# Patient Record
Sex: MALE | Race: WHITE | NOT HISPANIC OR LATINO | Employment: OTHER | URBAN - METROPOLITAN AREA
[De-identification: names, ages, dates, MRNs, and addresses within clinical notes are randomized per-mention and may not be internally consistent; named-entity substitution may affect disease eponyms.]

---

## 2021-11-09 ENCOUNTER — OFFICE VISIT (OUTPATIENT)
Dept: DERMATOLOGY | Facility: CLINIC | Age: 71
End: 2021-11-09
Payer: MEDICARE

## 2021-11-09 VITALS — BODY MASS INDEX: 34.96 KG/M2 | TEMPERATURE: 98.3 F | HEIGHT: 69 IN | WEIGHT: 236 LBS

## 2021-11-09 DIAGNOSIS — D22.60 MULTIPLE BENIGN MELANOCYTIC NEVI OF UPPER AND LOWER EXTREMITIES AND TRUNK: Primary | ICD-10-CM

## 2021-11-09 DIAGNOSIS — B35.3 TINEA PEDIS OF BOTH FEET: ICD-10-CM

## 2021-11-09 DIAGNOSIS — D22.70 MULTIPLE BENIGN MELANOCYTIC NEVI OF UPPER AND LOWER EXTREMITIES AND TRUNK: Primary | ICD-10-CM

## 2021-11-09 DIAGNOSIS — D22.5 MULTIPLE BENIGN MELANOCYTIC NEVI OF UPPER AND LOWER EXTREMITIES AND TRUNK: Primary | ICD-10-CM

## 2021-11-09 DIAGNOSIS — B35.6 TINEA CRURIS: ICD-10-CM

## 2021-11-09 DIAGNOSIS — D18.01 CHERRY ANGIOMA: ICD-10-CM

## 2021-11-09 PROCEDURE — 99203 OFFICE O/P NEW LOW 30 MIN: CPT | Performed by: DERMATOLOGY

## 2021-11-09 RX ORDER — DIFLUPREDNATE 0.5 MG/ML
1 EMULSION OPHTHALMIC 4 TIMES DAILY
COMMUNITY
Start: 2021-09-28

## 2021-11-09 RX ORDER — KETOROLAC TROMETHAMINE 5 MG/ML
SOLUTION OPHTHALMIC
COMMUNITY
Start: 2021-07-15

## 2021-11-09 RX ORDER — ATORVASTATIN CALCIUM 40 MG/1
TABLET, FILM COATED ORAL
COMMUNITY
Start: 2021-11-07

## 2021-11-09 RX ORDER — OMEPRAZOLE 20 MG/1
20 CAPSULE, DELAYED RELEASE ORAL
COMMUNITY

## 2021-11-09 RX ORDER — KETOCONAZOLE 20 MG/G
CREAM TOPICAL
Qty: 60 G | Refills: 2 | Status: SHIPPED | OUTPATIENT
Start: 2021-11-09

## 2022-09-26 ENCOUNTER — OFFICE VISIT (OUTPATIENT)
Dept: DERMATOLOGY | Facility: CLINIC | Age: 72
End: 2022-09-26
Payer: MEDICARE

## 2022-09-26 VITALS — WEIGHT: 229 LBS | TEMPERATURE: 96.9 F | HEIGHT: 69 IN | BODY MASS INDEX: 33.92 KG/M2

## 2022-09-26 DIAGNOSIS — Z12.83 SCREENING EXAM FOR SKIN CANCER: Primary | ICD-10-CM

## 2022-09-26 PROCEDURE — 99212 OFFICE O/P EST SF 10 MIN: CPT | Performed by: DERMATOLOGY

## 2022-09-26 NOTE — PATIENT INSTRUCTIONS
VIEYRA ANGIOMAS    Assessment and Plan:  Based on a thorough discussion of this condition and the management approach to it (including a comprehensive discussion of the known risks, side effects and potential benefits of treatment), the patient (family) agrees to implement the following specific plan:  Reassured benign    Assessment and Plan:    Cherry angioma, also known as [employer] spots, are benign vascular skin lesions  A "cherry angioma" is a firm red, blue or purple papule, 0 1-1 cm in diameter  When thrombosed, they can appear black in colour until evaluated with a dermatoscope when the red or purple colour is more easily seen  Cherry angioma may develop on any part of the body but most often appear on the scalp, face, lips and trunk  An angioma is due to proliferating endothelial cells; these are the cells that line the inside of a blood vessel  Angiomas can arise in early life or later in life; the most common type of angioma is a cherry angioma  Cherry angiomas are very common in males and females of any age or race  They are more noticeable in white skin than in skin of colour  They markedly increase in number from about the age of 36  There may be a family history of similar lesions  Eruptive cherry angiomas have been rarely reported to be associated with internal malignancy  The cause of angiomas is unknown  Genetic analysis of cherry angiomas has shown that they frequently carry specific somatic missense mutations in the GNAQ and GNA11 (Q209H) genes, which are involved in other vascular and melanocytic proliferations  Cherry angioma is usually diagnosed clinically and no investigations are necessary for the majority of lesions  It has a characteristic red-clod or lobular pattern on dermatoscopy (called lacunar pattern using conventional pattern analysis)  When there is uncertainty about the diagnosis, a biopsy may be performed   The angioma is composed of venules in a thickened papillary dermis  Collagen bundles may be prominent between the lobules  Cherry angiomas are harmless, so they do not usually have to be treated  Occasionally, they are removed to exclude a malignant skin lesion such as a nodular melanoma or because they are irritated or bleeding (and a subsequent risk for infection)  To decrease friction over the lesions, we recommend Neutrogena Daily Defense SPF 50+ at least 3 times a day  SEBORRHEIC KERATOSIS; NON-INFLAMED    Assessment and Plan:  Based on a thorough discussion of this condition and the management approach to it (including a comprehensive discussion of the known risks, side effects and potential benefits of treatment), the patient (family) agrees to implement the following specific plan:  Reassured benign    Seborrheic Keratosis  A seborrheic keratosis is a harmless warty spot that appears during adult life as a common sign of skin aging  Seborrheic keratoses can arise on any area of skin, covered or uncovered, with the usual exception of the palms and soles  They do not arise from mucous membranes  Seborrheic keratoses can have highly variable appearance  Seborrheic keratoses are extremely common  It has been estimated that over 90% of adults over the age of 61 years have one or more of them  They occur in males and females of all races, typically beginning to erupt in the 35s or 45s  They are uncommon under the age of 21 years  The precise cause of seborrhoeic keratoses is not known  Seborrhoeic keratoses are considered degenerative in nature  As time goes by, seborrheic keratoses tend to become more numerous  Some people inherit a tendency to develop a very large number of them; some people may have hundreds of them      The name "seborrheic keratosis" is misleading, because these lesions are not limited to a seborrhoeic distribution (scalp, mid-face, chest, upper back), nor are they formed from sebaceous glands, nor are they associated with sebum -- which is greasy  Seborrheic keratosis may also be called "SK," "Seb K," "basal cell papilloma," "senile wart," or "barnacle "      Researchers have noted:  Eruptive seborrhoeic keratoses can follow sunburn or dermatitis  Skin friction may be the reason they appear in body folds  Viral cause (e g , human papillomavirus) seems unlikely  Stable and clonal mutations or activation of FRFR3, PIK3CA, ZOHAIB, AKT1 and EGFR genes are found in seborrhoeic keratoses  Seborrhoeic keratosis can arise from solar lentigo  FRFR3 mutations also arise in solar lentigines  These mutations are associated with increased age and location on the head and neck, suggesting a role of ultraviolet radiation in these lesions  Seborrheic keratoses do not harbour tumour suppressor gene mutations  Epidermal growth factor receptor inhibitors, which are used to treat some cancers, often result in an increase in verrucal (warty) keratoses  There is no easy way to remove multiple lesions on a single occasion  Unless a specific lesion is "inflamed" and is causing pain or stinging/burning or is bleeding, most insurance companies do not authorize treatment  MELANOCYTIC NEVI ("Moles")    Assessment and Plan:  Based on a thorough discussion of this condition and the management approach to it (including a comprehensive discussion of the known risks, side effects and potential benefits of treatment), the patient (family) agrees to implement the following specific plan:    Reassure benign  Monitor for changes  Apply SPF 50 or higher multiple times a day  Wear sun protecting hats and clothing  Melanocytic Nevi  Melanocytic nevi ("moles") are caused by collections of the color producing skin cells, or melanocytes, in 1 area in the skin  They can range in color from pink to dark brown and be either raised or flat  Some moles are present at birth (I e , "congenital nevi"), while others come up later in life (i e , "acquired nevi")    Medardo Rosales exposure also stimulates the body to make more moles, ie the more sun you get the more moles you'll grow  Clinically distinguishing a healthy mole from melanoma may be difficult  The "ABCDE's" of moles have been suggested as a means of helping to alert a person to a suspicious mole and the possible increased risk of melanoma  Asymmetry: Healthy moles tend to be symmetric, while melanomas are often asymmetric  Asymmetry means if you draw a line through the mole, the two halves do not match in color, size, shape, or surface texture Any mole that starts to demonstrate "asymmetry" should be examined promptly by a board certified dermatologist      Border: Healthy moles tend to have discrete, even borders  The border of a melanoma often blends into the normal skin and does not sharply delineate the mole from normal skin  Any mole that starts to demonstrate "uneven borders" should be examined promptly     Color: Healthy moles tend to be one color throughout  Melanomas tend to be made up of different colors ranging from dark black, blue, white, or red  Any mole that demonstrates a color change should be examined promptly    Diameter: Healthy moles tend to be smaller than 0 6 cm in size; an exception are "congenital nevi" that can be larger  Melanomas tend to grow and can often be greater than 0 6 cm (1/4 of an inch, or the size of a pencil eraser)  This is only a guideline, and many normal moles may be larger than 0 6 cm without being unhealthy  Any mole that starts to change in size (small to bigger or bigger to smaller) should be examined promptly    Evolving: Healthy moles tend to "stay the same "  Melanomas may often show signs of change or evolution such as a change in size, shape, color, or elevation    Any mole that starts to itch, bleed, crust, burn, hurt, or ulcerate or demonstrate a change or evolution should be examined promptly by a board certified dermatologist       What are atypical moles or dysplastic nevi? Dysplastic moles are moles that have some of the ABCDE  changes listed above but  are not cancerous  Sometimes a biopsy and microscopic examination are needed to determine the difference  They may indicate an increased risk of melanoma in that person, especially if there is a family history of melanoma  What is a Melanoma? The main concern when looking at a new or changing mole it to evaluate whether it may be a melanoma  The appearance of a "new mole" remains one of the most reliable methods for identifying a malignant melanoma  A melanoma is a type of skin cancer that can be deadly if it spreads throughout the body  The prognosis of a Melanoma depends on how deep it has penetrated in the skin  If caught early, they generally will not have had time to grow into the deeper layers of the skin and they cure rate is then very high  Once the melanoma grows deeper into the skin, the cure rate drops dramatically  Therefore, early detection and removal of a malignant melanoma results in a much better chance of complete cure

## 2022-09-26 NOTE — PROGRESS NOTES
[facility] Follow Up Note    Patient Name: Herman Record  Encounter Date: 09/26/22    Today's Chief Concerns:  Haroon Marcial Concern #1:  Skin check      Current Medications:    Current Outpatient Medications:     atorvastatin (LIPITOR) 40 mg tablet, , Disp: , Rfl:     ketoconazole (NIZORAL) 2 % cream, Apply topically to feet twice a day for 4 week, Disp: 60 g, Rfl: 2    omeprazole (PriLOSEC) 20 mg delayed release capsule, Take 20 mg by mouth, Disp: , Rfl:     Difluprednate 0 05 % EMUL, Apply 1 drop to eye 4 (four) times a day (Patient not taking: Reported on 9/26/2022), Disp: , Rfl:     ketorolac (ACULAR) 0 5 % ophthalmic solution, START USING 1 GTT IN RIGHT EYE 3 DAYS PRIOR TO SURGERY, THEN FOLLOW DROP CHECK LIST (Patient not taking: Reported on 9/26/2022), Disp: , Rfl:     CONSTITUTIONAL:   Vitals:    09/26/22 1245   Temp: (!) 96 9 °F (36 1 °C)   TempSrc: Temporal   Weight: 104 kg (229 lb)   Height: 5' 9" (1 753 m)         Specific Alerts:    Have you been seen by a [facility] in the last 3 years? YES    Are you pregnant or planning to become pregnant? No    Are you currently or planning to be nursing or breast feeding? No    No Known Allergies    May we call your Preferred Phone number to discuss your specific medical information? YES    May we leave a detailed message that includes your specific medical information? YES    Have you traveled outside of the [facility] in the past 3 months? No    Do you currently have a pacemaker or defibrillator? No    Do you have any artificial heart valves, joints, plates, screws, rods, stents, pins, etc? No   - If Yes, were any placed within the last 2 years? Do you require any medications prior to a surgical procedure? No    Are you taking any medications that cause you to bleed more easily ("blood thinners") No    Have you ever experienced a rapid heartbeat with epinephrine?  No    Have you ever been treated with "gold" (gold sodium thiomalate) therapy? No    [facility] can help with wrinkles, "laugh lines," facial volume loss, "double chin," "love handles," age spots, and more  Are you interested in learning today about some of the skin enhancement procedures that we offer? (If Yes, please provide more detail) No    Review of Systems:  Have you recently had or currently have any of the following? · Fever or chills: No  · Night Sweats: No  · Headaches: No  · Weight Gain: No  · Weight Loss: No  · Blurry Vision: No  · Nausea: No  · Vomiting: No  · Diarrhea: No  · Blood in Stool: No  · Abdominal Pain: No  · Itchy Skin: No  · Painful Joints: yes  · Swollen Joints: YES  · Muscle Pain: No  · Irregular Mole: No  · Sun Burn: No  · Dry Skin: No  · Skin Color Changes: No  · Scar or Keloid: No  · Cold Sores/Fever Blisters: No  · Bacterial Infections/MRSA: No  · Anxiety: No  · Depression: No  · Suicidal or Homicidal Thoughts: No      PSYCH: Normal mood and affect  EYES: Normal conjunctiva  ENT: Normal lips and oral mucosa  CARDIOVASCULAR: No edema  RESPIRATORY: Normal respirations  HEME/LYMPH/IMMUNO:  No regional lymphadenopathy except as noted below in ASSESSMENT AND PLAN BY DIAGNOSIS    FULL ORGAN SYSTEM SKIN EXAM (SKIN)  Hair, Scalp, Ears, Face Normal except as noted below in Assessment   Neck, Cervical Chain Nodes Normal except as noted below in Assessment   Right Arm/Hand/Fingers Normal except as noted below in Assessment   Left Arm/Hand/Fingers Normal except as noted below in Assessment   Chest/Breasts/Axillae Viewed areas Normal except as noted below in Assessment   Abdomen, Umbilicus Normal except as noted below in Assessment   Back/Spine Normal except as noted below in Assessment   Right Leg, Foot, Toes Normal except as noted below in Assessment   Left Leg, Foot, Toes Normal except as noted below in Assessment     SCREENING SKIN EXAMINATION    Physical Exam:   Anatomic Location Affected:   Integument   Morphological Description:  Normal skin appearance   Pertinent Positives:   Pertinent Negatives: Additional History of Present Condition:  present for years  One Gina Martinez angioma, also known as [employer] spots, are benign vascular skin lesions  A "cherry angioma" is a firm red, blue or purple papule, 0 1-1 cm in diameter  When thrombosed, they can appear black in colour until evaluated with a dermatoscope when the red or purple colour is more easily seen  Cherry angioma may develop on any part of the body but most often appear on the scalp, face, lips and trunk  An angioma is due to proliferating endothelial cells; these are the cells that line the inside of a blood vessel  Angiomas can arise in early life or later in life; the most common type of angioma is a cherry angioma  Cherry angiomas are very common in males and females of any age or race  They are more noticeable in white skin than in skin of colour  They markedly increase in number from about the age of 36  There may be a family history of similar lesions  Eruptive cherry angiomas have been rarely reported to be associated with internal malignancy  The cause of angiomas is unknown  Genetic analysis of cherry angiomas has shown that they frequently carry specific somatic missense mutations in the GNAQ and GNA11 (Q209H) genes, which are involved in other vascular and melanocytic proliferations  Cherry angioma is usually diagnosed clinically and no investigations are necessary for the majority of lesions  It has a characteristic red-clod or lobular pattern on dermatoscopy (called lacunar pattern using conventional pattern analysis)  When there is uncertainty about the diagnosis, a biopsy may be performed  The angioma is composed of venules in a thickened papillary dermis  Collagen bundles may be prominent between the lobules  Cherry angiomas are harmless, so they do not usually have to be treated   Occasionally, they are removed to exclude a malignant skin lesion such as a nodular melanoma or because they are irritated or bleeding (and a subsequent risk for infection)  To decrease friction over the lesions, we recommend Neutrogena Daily Defense SPF 50+ at least 3 times a day  Seborrheic Keratosis (no such lesions of note)  A seborrheic keratosis is a harmless warty spot that appears during adult life as a common sign of skin aging  Seborrheic keratoses can arise on any area of skin, covered or uncovered, with the usual exception of the palms and soles  They do not arise from mucous membranes  Seborrheic keratoses can have highly variable appearance  Seborrheic keratoses are extremely common  It has been estimated that over 90% of adults over the age of 61 years have one or more of them  They occur in males and females of all races, typically beginning to erupt in the 35s or 45s  They are uncommon under the age of 21 years  The precise cause of seborrhoeic keratoses is not known  Seborrhoeic keratoses are considered degenerative in nature  As time goes by, seborrheic keratoses tend to become more numerous  Some people inherit a tendency to develop a very large number of them; some people may have hundreds of them  The name "seborrheic keratosis" is misleading, because these lesions are not limited to a seborrhoeic distribution (scalp, mid-face, chest, upper back), nor are they formed from sebaceous glands, nor are they associated with sebum -- which is greasy    Seborrheic keratosis may also be called "SK," "Seb K," "basal cell papilloma," "senile wart," or "barnacle "      Researchers have noted:   Eruptive seborrhoeic keratoses can follow sunburn or dermatitis   Skin friction may be the reason they appear in body folds   Viral cause (e g , human papillomavirus) seems unlikely   Stable and clonal mutations or activation of FRFR3, PIK3CA, ZOHAIB, AKT1 and EGFR genes are found in seborrhoeic keratoses   Seborrhoeic keratosis can arise from solar lentigo   FRFR3 mutations also arise in solar lentigines  These mutations are associated with increased age and location on the head and neck, suggesting a role of ultraviolet radiation in these lesions   Seborrheic keratoses do not harbour tumour suppressor gene mutations   Epidermal growth factor receptor inhibitors, which are used to treat some cancers, often result in an increase in verrucal (warty) keratoses  There is no easy way to remove multiple lesions on a single occasion  Unless a specific lesion is "inflamed" and is causing pain or stinging/burning or is bleeding, most insurance companies do not authorize treatment  · No atypical skin lesions in examined  · Monitor for changes  · Apply SPF 50 or higher multiple times a day  Wear sun protecting hats and clothing       Melanocytic Nevi  Melanocytic nevi ("moles") are caused by collections of the color producing skin cells, or melanocytes, in 1 area in the skin  They can range in color from pink to dark brown and be either raised or flat  Some moles are present at birth (I e , "congenital nevi"), while others come up later in life (i e , "acquired nevi")  Sunny Peralta exposure also stimulates the body to make more moles, ie the more sun you get the more moles you'll grow  Clinically distinguishing a healthy mole from melanoma may be difficult  The "ABCDE's" of moles have been suggested as a means of helping to alert a person to a suspicious mole and the possible increased risk of melanoma  Asymmetry: Healthy moles tend to be symmetric, while melanomas are often asymmetric  Asymmetry means if you draw a line through the mole, the two halves do not match in color, size, shape, or surface texture Any mole that starts to demonstrate "asymmetry" should be examined promptly by a board certified dermatologist      Border: Healthy moles tend to have discrete, even borders    The border of a melanoma often blends into the normal skin and does not sharply delineate the mole from normal skin  Any mole that starts to demonstrate "uneven borders" should be examined promptly     Color: Healthy moles tend to be one color throughout  Melanomas tend to be made up of different colors ranging from dark black, blue, white, or red  Any mole that demonstrates a color change should be examined promptly    Diameter: Healthy moles tend to be smaller than 0 6 cm in size; an exception are "congenital nevi" that can be larger  Melanomas tend to grow and can often be greater than 0 6 cm (1/4 of an inch, or the size of a pencil eraser)  This is only a guideline, and many normal moles may be larger than 0 6 cm without being unhealthy  Any mole that starts to change in size (small to bigger or bigger to smaller) should be examined promptly    Evolving: Healthy moles tend to "stay the same "  Melanomas may often show signs of change or evolution such as a change in size, shape, color, or elevation  Any mole that starts to itch, bleed, crust, burn, hurt, or ulcerate or demonstrate a change or evolution should be examined promptly by a board certified dermatologist       What are atypical moles or dysplastic nevi? Dysplastic moles are moles that have some of the ABCDE  changes listed above but  are not cancerous  Sometimes a biopsy and microscopic examination are needed to determine the difference  They may indicate an increased risk of melanoma in that person, especially if there is a family history of melanoma  What is a Melanoma? The main concern when looking at a new or changing mole it to evaluate whether it may be a melanoma  The appearance of a "new mole" remains one of the most reliable methods for identifying a malignant melanoma  A melanoma is a type of skin cancer that can be deadly if it spreads throughout the body  The prognosis of a Melanoma depends on how deep it has penetrated in the skin    If caught early, they generally will not have had time to grow into the deeper layers of the skin and they cure rate is then very high  Once the melanoma grows deeper into the skin, the cure rate drops dramatically  Therefore, early detection and removal of a malignant melanoma results in a much better chance of complete cure         Scribe Attestation    I,:  Shantelle Sidhu am acting as a scribe while in the presence of the attending physician :       I,:  Hai Coreas MD personally performed the services described in this documentation    as scribed in my presence :

## 2023-01-03 ENCOUNTER — TELEPHONE (OUTPATIENT)
Dept: UROLOGY | Facility: MEDICAL CENTER | Age: 73
End: 2023-01-03

## 2023-01-03 NOTE — TELEPHONE ENCOUNTER
Call franca almanza to patient and spoke with him  Offered patient an appointment this week with MD for 2nd opinion  Pt stated that he is leaving for [place] this week and is looking to transfer his care to [facility] since they will be there for 4 months  Pt will contact our office if appointment is needed when he returns to the area

## 2023-01-03 NOTE — TELEPHONE ENCOUNTER
Please Triage -   New Patient- Susi Dumont    What is the reason for the patients appointment? Patient's wife called stating they are looking for a second opinion  Patient was just diagnosed with prostate cancer  They [facility] and they are to do a biopsy for April  And they don't want to wait that long   They are concerned and would like a consultation with our office to see how soon he can be seen  They are willing to come to any of the offices  Patient had mri of prostate and it showed    7 mm, PI-RADS 4 lesion, right lateral apex peripheral zone  No   extraprostatic extension  2   6 mm, PI-RADS 4 lesion, left medial apex peripheral zone  No extraprostatic   extension  3   Changes of benign prostatic hyperplasia  PI-RADS v2 1 assessment categories   PIRADS 1 - Very low (clinically significant cancer is highly unlikely  to be   present)   PIRADS 2 - Low (clinically significant cancer is unlikely to be  present)   PIRADS 3 - Intermediate (the presence of clinically significant  cancer is   equivocal)   PIRADS 4 - High (clinically significant cancer likely present)   PIRADS 5 - Very high (clinically significant cancer is highly likely  to be   present)         Imaging/Lab Results:      Do we accept the patient's insurance or is the patient Self-Pay? Provider & Plan:   Member ID#: Has the patient had any previous urologist(s)? [facility]       Have patient records been requested? Has the patient had any outside testing done?mri in care everywhere      Does the patient have a personal history of cancer?no         Patient can be reached at :